# Patient Record
Sex: MALE | Race: BLACK OR AFRICAN AMERICAN | NOT HISPANIC OR LATINO | Employment: FULL TIME | URBAN - METROPOLITAN AREA
[De-identification: names, ages, dates, MRNs, and addresses within clinical notes are randomized per-mention and may not be internally consistent; named-entity substitution may affect disease eponyms.]

---

## 2018-05-26 ENCOUNTER — COMMUNICATION - HEALTHEAST (OUTPATIENT)
Dept: SCHEDULING | Facility: CLINIC | Age: 38
End: 2018-05-26

## 2018-06-25 ENCOUNTER — COMMUNICATION - HEALTHEAST (OUTPATIENT)
Dept: INFECTIOUS DISEASES | Facility: CLINIC | Age: 38
End: 2018-06-25

## 2018-07-24 ENCOUNTER — OFFICE VISIT - HEALTHEAST (OUTPATIENT)
Dept: INFECTIOUS DISEASES | Facility: CLINIC | Age: 38
End: 2018-07-24

## 2018-07-24 ENCOUNTER — AMBULATORY - HEALTHEAST (OUTPATIENT)
Dept: LAB | Facility: CLINIC | Age: 38
End: 2018-07-24

## 2018-07-24 DIAGNOSIS — B58.2 CNS TOXOPLASMOSIS (H): ICD-10-CM

## 2018-07-24 DIAGNOSIS — B20 AIDS (ACQUIRED IMMUNE DEFICIENCY SYNDROME) (H): ICD-10-CM

## 2018-07-24 LAB
ALBUMIN SERPL-MCNC: 4.7 G/DL (ref 3.5–5)
ALP SERPL-CCNC: 92 U/L (ref 45–120)
ALT SERPL W P-5'-P-CCNC: 187 U/L (ref 0–45)
ANION GAP SERPL CALCULATED.3IONS-SCNC: 13 MMOL/L (ref 5–18)
AST SERPL W P-5'-P-CCNC: 80 U/L (ref 0–40)
BASOPHILS # BLD AUTO: 0 THOU/UL (ref 0–0.2)
BASOPHILS NFR BLD AUTO: 1 % (ref 0–2)
BILIRUB SERPL-MCNC: 0.3 MG/DL (ref 0–1)
BUN SERPL-MCNC: 7 MG/DL (ref 8–22)
CALCIUM SERPL-MCNC: 10.5 MG/DL (ref 8.5–10.5)
CHLORIDE BLD-SCNC: 104 MMOL/L (ref 98–107)
CO2 SERPL-SCNC: 22 MMOL/L (ref 22–31)
CREAT SERPL-MCNC: 1.13 MG/DL (ref 0.7–1.3)
EOSINOPHIL # BLD AUTO: 0.1 THOU/UL (ref 0–0.4)
EOSINOPHIL NFR BLD AUTO: 2 % (ref 0–6)
ERYTHROCYTE [DISTWIDTH] IN BLOOD BY AUTOMATED COUNT: 15.7 % (ref 11–14.5)
GFR SERPL CREATININE-BSD FRML MDRD: >60 ML/MIN/1.73M2
GLUCOSE BLD-MCNC: 116 MG/DL (ref 70–125)
HCT VFR BLD AUTO: 47 % (ref 40–54)
HGB BLD-MCNC: 15.6 G/DL (ref 14–18)
LYMPHOCYTES # BLD AUTO: 1.3 THOU/UL (ref 0.8–4.4)
LYMPHOCYTES NFR BLD AUTO: 37 % (ref 20–40)
MCH RBC QN AUTO: 31.9 PG (ref 27–34)
MCHC RBC AUTO-ENTMCNC: 33.2 G/DL (ref 32–36)
MCV RBC AUTO: 96 FL (ref 80–100)
MONOCYTES # BLD AUTO: 0.3 THOU/UL (ref 0–0.9)
MONOCYTES NFR BLD AUTO: 9 % (ref 2–10)
NEUTROPHILS # BLD AUTO: 1.8 THOU/UL (ref 2–7.7)
NEUTROPHILS NFR BLD AUTO: 52 % (ref 50–70)
PLATELET # BLD AUTO: 168 THOU/UL (ref 140–440)
PMV BLD AUTO: 8.7 FL (ref 7–10)
POTASSIUM BLD-SCNC: 4.3 MMOL/L (ref 3.5–5)
PROT SERPL-MCNC: 8.8 G/DL (ref 6–8)
RBC # BLD AUTO: 4.89 MILL/UL (ref 4.4–6.2)
SODIUM SERPL-SCNC: 139 MMOL/L (ref 136–145)
WBC: 3.5 THOU/UL (ref 4–11)

## 2018-07-24 ASSESSMENT — MIFFLIN-ST. JEOR: SCORE: 1436.96

## 2018-07-25 ENCOUNTER — COMMUNICATION - HEALTHEAST (OUTPATIENT)
Dept: ADMINISTRATIVE | Facility: CLINIC | Age: 38
End: 2018-07-25

## 2018-07-25 LAB
CD3 CELLS # BLD: 1064 /UL (ref 471–2787)
CD3 CELLS NFR BLD: 83 % (ref 53–86)
CD3+CD4+ CELLS # BLD: 64 /UL (ref 269–1625)
CD3+CD4+ CELLS NFR BLD: 5 % (ref 31–67)
CD3+CD4+ CELLS/CD3+CD8+ CLL BLD: 0.07 % (ref 0.8–4.5)
CD3+CD8+ CELLS # BLD: 980 /UL (ref 117–923)
CD3+CD8+ CELLS NFR BLD: 77 % (ref 13–44)
LYMPHOCYTES # BLD AUTO: 1.3 THOU/UL (ref 0.8–4.4)

## 2018-07-26 LAB
T GONDII IGG SER-ACNC: 137 IU/ML
T GONDII IGM SER-ACNC: 3 AU/ML

## 2018-07-27 ENCOUNTER — COMMUNICATION - HEALTHEAST (OUTPATIENT)
Dept: ADMINISTRATIVE | Facility: CLINIC | Age: 38
End: 2018-07-27

## 2018-07-27 LAB
HIV1 RNA # PLAS NAA DL=20: 51 {COPIES}/ML
HIV1 RNA SERPL NAA+PROBE-LOG#: 1.7 {LOG_COPIES}/ML

## 2021-06-01 VITALS — BODY MASS INDEX: 18.79 KG/M2 | WEIGHT: 124 LBS | HEIGHT: 68 IN

## 2021-06-19 NOTE — PROGRESS NOTES
[facility] HIV Follow Up.    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    HPI/Interval History:  Hunter Slaughter is a 38 y.o. male with the history of HIV diagnosed in the mid  who unfortunately did not follow through.  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  His toxo IgG came back positive with a titer 104.  The patient was initially started on IV Bactrim while waiting for sulfadiazine and pyrimethamine to be available.  He was later transitioned to this combination of sulfadiazine and pyrimethamine once the medication became available.  On 2018 the patient was started on the reticular vera plus Truvada.  He was discharged from the hospital on 2018.  ID had recommended follow-up MRI 2 weeks from initiation of therapy with antitoxoplasma antimicrobial.  This however has never been done.  He was also supposed to follow-up 3 weeks from a previous hospital discharge with Dr. Martinez but I am unsure why this actually never happened.  She is in clinic today with no medication list.  He does endorse taking HIV medications but cannot say anything beyond that.  He denies headache, visual problems, skin rashes, nausea, vomiting, diarrhea, shortness of breath, cough.  His weight is increasing.  Of note he claims today that he was never informed about his HIV status even though documentation from [facility] says otherwise.  He was confronted with the reality of the fact that keeping his HIV status from his girlfriend is a criminal act.  Of note the girlfriend was seen by myself couple weeks ago and had to negative HIV test fortunately.  She had told me back then that she will not be sexually intimate with heme or any other man for that matter.     PMH:  Past  Medical History:   Diagnosis Date     HIV (human immunodeficiency virus infection) (H)        PSH:  No prior surgery.    Social History/Family History:  Single now but used to have a girlfriend who is HIV negative.  Does not smoke.  No drug.  No family history contributory current disease process per    Allergies:  No known allergies    Medications: The medication list below could be wrong.  The patient will bring his medications for reconciliation today 07/24/18    Current Outpatient Prescriptions on File Prior to Visit   Medication Sig Dispense Refill     acetaminophen (TYLENOL) 325 MG tablet Take 2 tablets (650 mg total) by mouth every 6 (six) hours as needed for pain or fever. 20 tablet 0     dolutegravir (TIVICAY) 50 mg Tab tablet Take 1 tablet (50 mg total) by mouth daily. 30 tablet 0     emtricitabine-tenofovir, TDF, (TRUVADA) 200-300 mg per tablet Take 1 tablet by mouth daily. 30 tablet 0     leucovorin (WELLCOVORIN) 15 MG tablet Take 1 tablet (15 mg total) by mouth daily. 30 tablet 0     azithromycin (ZITHROMAX) 600 MG tablet Take 2 tablets (1,200 mg total) by mouth once a week. 8 tablet 0     bisacodyl (DULCOLAX) 10 mg suppository Insert suppository rectally daily as needed for treatment of constipation. 10 suppository 0     levETIRAcetam (KEPPRA) 500 MG tablet Take 1 tablet (500 mg total) by mouth 2 (two) times a day. 60 tablet 0     magnesium hydroxide (MILK OF MAG) 400 mg/5 mL Susp suspension Take 30 mL by mouth daily as needed. 300 mL 0     pyrimethamine (DARAPRIM) 25 mg tablet Take 2 tablets (50 mg total) by mouth daily. 60 tablet 0     sulfaDIAZINE 500 mg tablet Take 2 tablets (1,000 mg total) by mouth 4 (four) times a day. 240 tablet 0     No current facility-administered medications on file prior to visit.          Review of System:  12 points review of system is negative except for findings in the HPI.    Exam  VS:   Vitals:    07/24/18 0915   BP: 110/62   Pulse: 74   Temp: 97.7  F (36.5  C)    SpO2: 99%       Gen: Pleasant in no acute distress.  HEENT: NCAT. EOMI.  Neck: No LAD.  Lungs: Clear to auscultation bilaterally with no crackles or wheezes.   Card: RRR. No RMG. Peripheral pulses present and symmetrical. No edema.   Abd: Soft NT ND. No hepatomegaly or splenomegaly.  Ext: No edema  Lymph: No cervical or supraclavicular adenopathy.  Skin: No rash  Neuro: Alert and oriented to place time and person. Cranial nerves grossly intact.     Labs:  VL: [id number]     [facility] Miscellaneous Test SEE NOTE   Comments: Test name                     Result Flag Units  RefIntvl     -------------------------------------------------------------   HIV-1 Genotype by Sequencing                               See Note                           Drug Resistance:   NRTI Drug Class         VIDEX, (didanosine, ddI)                             None     VIREAD, (tenofovir, TDF)                             None     ZERIT, (stavudine, d4T)                              None     ZIAGEN, (abacavir, ABC)                              None     EMTRIVA, (emtricitabine, FTC)                        None     RETROVIR, (zidovudine, ZDV)                          None     EPIVIR, (lamivudine, 3TC)                            None         NRTI drug resistance mutations identified: None       NNRTI Drug Class         SUSTIVA, (efavirenz, EFV)                            None     VIRAMUNE, (nevirapine, NVP)                          None     INTELENCE, (etravirine, ETR)                         None     EDURANT, (rilpivirine, RPV)                          None         NNRTI drug resistance mutations identified: None       PI+ Drug Class         VIRACEPT, (nelfinavir, NFV)                          None     APTIVUS, (tipranavir, TPV)                           None     CRIXIVAN, (indinavir, IDV)                           None     KALETRA, (lopinavir + ritonavir, LPV)                None     REYATAZ, (atazanavir, ATV)                           None      "PREZISTA, (darunavir, DRV)                           None     LEXIVA, (fosamprenavir, FPV)                         None     FORTOVASE / INVIRASE, (saquinavir, SQV)              None         PI+ drug resistance mutations identified: None       Additional Mutations: The following amino acids differing     from the reference sequence (HXB-2, accession number [id number])     at the indicated codon positions were identified and may be     useful as a baseline determination of virus genotype.         Protease: V3I, I13V, I15L, G16E, K20I, S37N, R41K, I64V,       I72T, V77I     RT: V35T, K49R, V60I, D123N, I135T, T139A, K173R, D177E,       I178M, E204K, Q207E, Q207K, R211K, V245K, D250E, R277K,       L282C, I293V, G335D         + Evidence of Resistance for Protease Inhibitors     estimates response to ritonavir boosted regimens. Refer     to \"Evidence of Resistance Legend.\" The protease     inhibitor (PI) evidence of resistance interpretations     were developed to estimate the expected virological     response to standard doses of protease inhibitors with     pharmacokinetic boosting by ritonavir. This has become     the most common method of administering each of the     protease inhibitors, except nelfinavir (ref. 1), to     ensure adequate drug levels in all patients. Boosted PIs     are more active in the presence of resistance than     nonboosted Pls(ref. 2,3)       Evidence of Resistance Legend:     Resistance: Mutations present constitute a high level of     genetic evidence for viral resistance.     Possible Resistance: Mutations present suggest the     possibility of viral resistance.     None: There is insufficient evidence for viral     resistance.       Software Version: [employer] HIV-1 System v2.0; [employer] Software v3.0   INTERPRETIVE INFORMATION: HIV-1 Genotyping     This assay predicts HIV-1 resistance to protease and reverse   transcriptase inhibitor anti-retroviral drugs. The protease gene   and codons 1-335 of " the reverse transcriptase gene of the viral   genome are sequenced using the [employer] HIV-1 Genotyping System   kit. Drug resistance is assigned using [employer] software. The most   current resistance algorithm and drug list is available by   selecting the Drug Resistance Report found in the test directory.     This test should be used in conjunction with clinical presentation   and other laboratory markers. A patient's response to therapy   depends on multiple factors, including patient compliance,   percentage of resistant virus population, dosing, and drug   pharmacology issues. Resistance interpretations may vary with   methodology.     Some insertions or deletions may be difficult to detect using this   software. This test may not detect minor HIV-1 populations less   than 20 percent of the total population.     Test developed and characteristics determined by [facility]. See Compliance Statement B: [employer]   EER HIV-1 Genotype by Sequencing                               See Note                         Access 1SDK Enhanced Report using either link below:       -Direct access:   [email or web address]       -Enter Username, Password: [email or web address]    Username: [email or web address]    Password: [email or web address]   Performed by [facility],   [address] [place] [id number] [phone]   [email or web address], Isael Pérez MD, Lab. Director       Imaging:  [facility]  MR BRAIN W WO CONTRAST  5/26/2018 8:09 PM     INDICATION: Multiple edematous lesions. Metastasis versus infection. Patient's sister notes that the patient has had decreased oral intake, weight loss and generalized weakness and difficulty speaking.  TECHNIQUE: Multiplanar T1- and T2-weighted images were obtained through the brain pre and post administration of contrast.  CONTRAST: 5.5 mL Gadavist IV.  COMPARISON: CT brain and limited MRI brain performed same day.      FINDINGS: Innumerable ring-enhancing  lesions are seen throughout both cerebral and cerebellar hemispheres. The largest lesion is seen involving the parasagittal left frontal-parietal junction and posterior cingulate gyrus measuring roughly 2.1 x 3.4 x   2.6 cm in size. There is extensive surrounding vasogenic edema. Lesions within the posterior parasagittal right frontal lobe, anterior right frontal lobe, right medial temporal lobe, and anterior and mid left frontal lobe also demonstrate extensive   surrounding vasogenic edema. A few of the lesions demonstrate slightly increased signal on susceptibility-weighted images compatible with petechial hemorrhage. No space-occupying hematoma. A few of the lesions demonstrate somewhat nodular internal   enhancement.     Ventricles are normal in size. No midline shift. Cerebellar tonsils are normally positioned. Visualized upper cervical spinal cord and sella are unremarkable. Corpus callosum is normally formed. Visualized marrow space is unremarkable. Major skull base   vascular flow voids are preserved.     Orbits are normal in appearance. Paranasal sinuses, middle ear cavities, and mastoid air cells are clear.     IMPRESSION:   1. Numerous predominantly ring-enhancing lesions are seen throughout both cerebral and cerebellar hemispheres. Given HIV, if immunocompromised, findings are most concerning for an atypical or opportunistic infection. In the immunocompetent patient,   parasitic infection or septic emboli considered more likely. Although metastasis could have this appearance, given clinical history, this is considered less likely.   2. No finding for acute infarct or space-occupying hematoma.       Assessment:  Hunter Slaughter is a 38 y.o. male    1-HIV, asymptomatic/symptomatic with AIDS and CNS toxoplasmosis.   CD 4 count 17 on 5/24/2018 at [facility]. VL [id number] on 5/26/2018 at [facility]  HAART with Dolutegravir + Truvada since 6/1/2018      Plan:  -Continue Dolutegravir + Truvada  -Routine HIV labs today:  Yes.      2-Routine screening for HIV,   -UA for micro-albuminuria negative  -CMV IgG positive  -Toxo IgG positive and IgM negative.   -Syphilis cascade negative  -Gonorrhea/Clamydia needs to be done  -Quantiferon gold/PPD negative  -Genotype/Phenotype with no resistance.   -Renal function: Normal  -Liver function: Normal  -HLA     3-Prevention,   -Safe sex discussed with the patient.  -Screening as above.   -Vaccines: Will be assessed next visit.  -Cardiovascular problems none.   -Smoking none  -Renal issues none  -Psych: None    4-Presumed CNS toxoplasmosis.  On sulfadiazine plus pyrimethamine.  Follow-up MRI ordered.  Follow toxo IgG level.  We will consider changing from sulfadiazine cream intermittently to Bactrim if MRI looks good for secondary prophylaxis.    Follow up:   4 weeks.    Needs  at next visit.    I asked to bring his medications back to clinic today for proper medication reconciliation    KIMMIE Lyon  [facility]  [facility]  [phone] Option-2

## 2023-11-17 ENCOUNTER — TELEPHONE (OUTPATIENT)
Dept: INFECTIOUS DISEASES | Facility: CLINIC | Age: 43
End: 2023-11-17
Payer: COMMERCIAL

## 2023-11-17 NOTE — TELEPHONE ENCOUNTER
Need to verify patient wants to come to [facility] as they were seen by LOIS and lives near [place].       Christopher Hernandez RN  Infectious Disease on 11/17/2023 at 11:31 AM

## 2023-11-17 NOTE — TELEPHONE ENCOUNTER
[facility] Call Center    Phone Message    May a detailed message be left on voicemail: yes     Reason for Call: Appointment Intake      Referring Provider Name: self referred    Diagnosis and/or Symptoms: HIV    Please review and contact patient back to schedule. Please advise.       Action Taken: Message routed to:  [facility] ([facility] ID    Travel Screening: Not Applicable

## 2023-11-20 NOTE — TELEPHONE ENCOUNTER
Contacted patient. Confirmed he is wanting to be seen at [facility]. Provided number to patient.